# Patient Record
Sex: MALE | Race: WHITE | Employment: OTHER | URBAN - NONMETROPOLITAN AREA
[De-identification: names, ages, dates, MRNs, and addresses within clinical notes are randomized per-mention and may not be internally consistent; named-entity substitution may affect disease eponyms.]

---

## 2017-04-03 ENCOUNTER — OFFICE VISIT (OUTPATIENT)
Dept: OPHTHALMOLOGY | Age: 67
End: 2017-04-03
Payer: MEDICARE

## 2017-04-03 DIAGNOSIS — H02.839 DERMATOCHALASIS, UNSPECIFIED LATERALITY: ICD-10-CM

## 2017-04-03 DIAGNOSIS — H35.372 MACULAR PUCKERING OF RETINA, LEFT: ICD-10-CM

## 2017-04-03 DIAGNOSIS — H35.3290 EXUDATIVE SENILE MACULAR DEGENERATION OF RETINA (HCC): ICD-10-CM

## 2017-04-03 DIAGNOSIS — H40.10X1 COAG (CHRONIC OPEN-ANGLE GLAUCOMA), MILD STAGE: Primary | ICD-10-CM

## 2017-04-03 PROCEDURE — 1036F TOBACCO NON-USER: CPT | Performed by: OPHTHALMOLOGY

## 2017-04-03 PROCEDURE — G8421 BMI NOT CALCULATED: HCPCS | Performed by: OPHTHALMOLOGY

## 2017-04-03 PROCEDURE — G8427 DOCREV CUR MEDS BY ELIG CLIN: HCPCS | Performed by: OPHTHALMOLOGY

## 2017-04-03 PROCEDURE — 3017F COLORECTAL CA SCREEN DOC REV: CPT | Performed by: OPHTHALMOLOGY

## 2017-04-03 PROCEDURE — 4040F PNEUMOC VAC/ADMIN/RCVD: CPT | Performed by: OPHTHALMOLOGY

## 2017-04-03 PROCEDURE — 99213 OFFICE O/P EST LOW 20 MIN: CPT | Performed by: OPHTHALMOLOGY

## 2017-04-03 PROCEDURE — 1123F ACP DISCUSS/DSCN MKR DOCD: CPT | Performed by: OPHTHALMOLOGY

## 2017-04-03 RX ORDER — BENOXINATE HCL/FLUORESCEIN SOD 0.4%-0.25%
1 DROPS OPHTHALMIC (EYE) ONCE
Status: COMPLETED | OUTPATIENT
Start: 2017-04-03 | End: 2017-04-03

## 2017-04-03 RX ADMIN — Medication 1 DROP: at 08:09

## 2017-04-03 ASSESSMENT — TONOMETRY
OS_IOP_MMHG: 20
OD_IOP_MMHG: 20
IOP_METHOD: APPLANATION W FLURESS DROP

## 2017-04-03 ASSESSMENT — VISUAL ACUITY
OS_CC: 20/25
METHOD: SNELLEN - LINEAR
CORRECTION_TYPE: GLASSES

## 2017-04-03 ASSESSMENT — ENCOUNTER SYMPTOMS
GASTROINTESTINAL NEGATIVE: 1
RESPIRATORY NEGATIVE: 1

## 2017-04-03 ASSESSMENT — SLIT LAMP EXAM - LIDS
COMMENTS: 2+ DERMATOCHALASIS - UPPER LID
COMMENTS: 2+ DERMATOCHALASIS - UPPER LID

## 2017-08-08 ENCOUNTER — OFFICE VISIT (OUTPATIENT)
Dept: OPHTHALMOLOGY | Age: 67
End: 2017-08-08
Payer: MEDICARE

## 2017-08-08 DIAGNOSIS — H40.10X1 ADVANCED OPEN-ANGLE GLAUCOMA, MILD STAGE: Primary | ICD-10-CM

## 2017-08-08 PROCEDURE — 92083 EXTENDED VISUAL FIELD XM: CPT | Performed by: OPHTHALMOLOGY

## 2017-08-08 PROCEDURE — 92133 CPTRZD OPH DX IMG PST SGM ON: CPT | Performed by: OPHTHALMOLOGY

## 2017-08-08 PROCEDURE — 1036F TOBACCO NON-USER: CPT | Performed by: OPHTHALMOLOGY

## 2017-08-08 RX ORDER — TROPICAMIDE 10 MG/ML
1 SOLUTION/ DROPS OPHTHALMIC ONCE
Status: COMPLETED | OUTPATIENT
Start: 2017-08-08 | End: 2017-08-08

## 2017-08-08 RX ORDER — PHENYLEPHRINE HCL 2.5 %
1 DROPS OPHTHALMIC (EYE) ONCE
Status: COMPLETED | OUTPATIENT
Start: 2017-08-08 | End: 2017-08-08

## 2017-08-08 RX ADMIN — Medication 1 DROP: at 09:04

## 2017-08-08 RX ADMIN — TROPICAMIDE 1 DROP: 10 SOLUTION/ DROPS OPHTHALMIC at 09:04

## 2017-08-14 ENCOUNTER — OFFICE VISIT (OUTPATIENT)
Dept: OPHTHALMOLOGY | Age: 67
End: 2017-08-14
Payer: MEDICARE

## 2017-08-14 DIAGNOSIS — H02.839 DERMATOCHALASIS, UNSPECIFIED LATERALITY: ICD-10-CM

## 2017-08-14 DIAGNOSIS — H35.3290 EXUDATIVE SENILE MACULAR DEGENERATION OF RETINA (HCC): ICD-10-CM

## 2017-08-14 DIAGNOSIS — H40.10X1 ADVANCED OPEN-ANGLE GLAUCOMA, MILD STAGE: Primary | ICD-10-CM

## 2017-08-14 DIAGNOSIS — H35.372 MACULAR PUCKERING OF RETINA, LEFT: ICD-10-CM

## 2017-08-14 PROCEDURE — 1123F ACP DISCUSS/DSCN MKR DOCD: CPT | Performed by: OPHTHALMOLOGY

## 2017-08-14 PROCEDURE — G8421 BMI NOT CALCULATED: HCPCS | Performed by: OPHTHALMOLOGY

## 2017-08-14 PROCEDURE — 1036F TOBACCO NON-USER: CPT | Performed by: OPHTHALMOLOGY

## 2017-08-14 PROCEDURE — 3017F COLORECTAL CA SCREEN DOC REV: CPT | Performed by: OPHTHALMOLOGY

## 2017-08-14 PROCEDURE — 99214 OFFICE O/P EST MOD 30 MIN: CPT | Performed by: OPHTHALMOLOGY

## 2017-08-14 PROCEDURE — G8427 DOCREV CUR MEDS BY ELIG CLIN: HCPCS | Performed by: OPHTHALMOLOGY

## 2017-08-14 PROCEDURE — 4040F PNEUMOC VAC/ADMIN/RCVD: CPT | Performed by: OPHTHALMOLOGY

## 2017-08-14 RX ORDER — TROPICAMIDE 10 MG/ML
1 SOLUTION/ DROPS OPHTHALMIC ONCE
Status: COMPLETED | OUTPATIENT
Start: 2017-08-14 | End: 2017-08-14

## 2017-08-14 RX ORDER — BENOXINATE HCL/FLUORESCEIN SOD 0.4%-0.25%
1 DROPS OPHTHALMIC (EYE) ONCE
Status: COMPLETED | OUTPATIENT
Start: 2017-08-14 | End: 2017-08-14

## 2017-08-14 RX ORDER — PHENYLEPHRINE HCL 2.5 %
1 DROPS OPHTHALMIC (EYE) ONCE
Status: COMPLETED | OUTPATIENT
Start: 2017-08-14 | End: 2017-08-14

## 2017-08-14 RX ADMIN — TROPICAMIDE 1 DROP: 10 SOLUTION/ DROPS OPHTHALMIC at 08:24

## 2017-08-14 RX ADMIN — Medication 1 DROP: at 08:23

## 2017-08-14 ASSESSMENT — TONOMETRY
OD_IOP_MMHG: 16
OS_IOP_MMHG: 18
IOP_METHOD: APPLANATION W FLURESS DROP

## 2017-08-14 ASSESSMENT — VISUAL ACUITY
OD_CC+: -2
METHOD: SNELLEN - LINEAR
OS_CC: 20/30
CORRECTION_TYPE: GLASSES

## 2017-08-14 ASSESSMENT — SLIT LAMP EXAM - LIDS
COMMENTS: 2+ DERMATOCHALASIS - UPPER LID
COMMENTS: 2+ DERMATOCHALASIS - UPPER LID

## 2017-08-14 ASSESSMENT — ENCOUNTER SYMPTOMS
RESPIRATORY NEGATIVE: 1
GASTROINTESTINAL NEGATIVE: 1

## 2017-09-20 RX ORDER — TIMOLOL MALEATE 5 MG/ML
SOLUTION/ DROPS OPHTHALMIC
Qty: 30 ML | Refills: 3 | Status: SHIPPED | OUTPATIENT
Start: 2017-09-20 | End: 2018-06-07 | Stop reason: SINTOL

## 2017-12-06 ENCOUNTER — OFFICE VISIT (OUTPATIENT)
Dept: OPHTHALMOLOGY | Age: 67
End: 2017-12-06
Payer: MEDICARE

## 2017-12-06 DIAGNOSIS — H02.839 DERMATOCHALASIS, UNSPECIFIED LATERALITY: ICD-10-CM

## 2017-12-06 DIAGNOSIS — H40.10X1 ADVANCED OPEN-ANGLE GLAUCOMA, MILD STAGE: Primary | ICD-10-CM

## 2017-12-06 PROCEDURE — G8421 BMI NOT CALCULATED: HCPCS | Performed by: OPHTHALMOLOGY

## 2017-12-06 PROCEDURE — G8427 DOCREV CUR MEDS BY ELIG CLIN: HCPCS | Performed by: OPHTHALMOLOGY

## 2017-12-06 PROCEDURE — 1123F ACP DISCUSS/DSCN MKR DOCD: CPT | Performed by: OPHTHALMOLOGY

## 2017-12-06 PROCEDURE — 1036F TOBACCO NON-USER: CPT | Performed by: OPHTHALMOLOGY

## 2017-12-06 PROCEDURE — 4040F PNEUMOC VAC/ADMIN/RCVD: CPT | Performed by: OPHTHALMOLOGY

## 2017-12-06 PROCEDURE — G8484 FLU IMMUNIZE NO ADMIN: HCPCS | Performed by: OPHTHALMOLOGY

## 2017-12-06 PROCEDURE — 99213 OFFICE O/P EST LOW 20 MIN: CPT | Performed by: OPHTHALMOLOGY

## 2017-12-06 PROCEDURE — 3017F COLORECTAL CA SCREEN DOC REV: CPT | Performed by: OPHTHALMOLOGY

## 2017-12-06 RX ORDER — BENOXINATE HCL/FLUORESCEIN SOD 0.4%-0.25%
1 DROPS OPHTHALMIC (EYE) ONCE
Status: COMPLETED | OUTPATIENT
Start: 2017-12-06 | End: 2017-12-06

## 2017-12-06 RX ADMIN — Medication 1 DROP: at 09:55

## 2017-12-06 ASSESSMENT — ENCOUNTER SYMPTOMS
GASTROINTESTINAL NEGATIVE: 1
RESPIRATORY NEGATIVE: 1

## 2017-12-06 ASSESSMENT — VISUAL ACUITY
OS_CC+: -1
OD_CC+: -2
METHOD: SNELLEN - LINEAR
OS_CC: 20/30

## 2017-12-06 ASSESSMENT — SLIT LAMP EXAM - LIDS
COMMENTS: 2+ DERMATOCHALASIS - UPPER LID
COMMENTS: 2+ DERMATOCHALASIS - UPPER LID

## 2017-12-06 ASSESSMENT — TONOMETRY
IOP_METHOD: APPLANATION W FLURESS DROP
OS_IOP_MMHG: 18
OD_IOP_MMHG: 20

## 2017-12-06 NOTE — PATIENT INSTRUCTIONS
Take medication as prescribed. If you have any problems or concerns before your next scheduled appointment, please call the office at [phone].

## 2017-12-06 NOTE — PROGRESS NOTES
all below with patient. 1. Advanced open-angle glaucoma, mild stage  Timolol Maleate 0.5% 1 gtt OU  BID      2. Dermatochalasis, unspecified laterality  monitor     Patient Instructions   Take medication as prescribed. If you have any problems or concerns before your next scheduled appointment, please call the office at [phone]. Return in about 4 months (around 4/6/2018) for IOP check.

## 2018-06-07 ENCOUNTER — OFFICE VISIT (OUTPATIENT)
Dept: OPHTHALMOLOGY | Age: 68
End: 2018-06-07
Payer: MEDICARE

## 2018-06-07 DIAGNOSIS — H25.813 COMBINED FORMS OF AGE-RELATED CATARACT OF BOTH EYES: ICD-10-CM

## 2018-06-07 DIAGNOSIS — H40.1131 PRIMARY OPEN ANGLE GLAUCOMA OF BOTH EYES, MILD STAGE: Primary | ICD-10-CM

## 2018-06-07 DIAGNOSIS — H35.3131 NONEXUDATIVE AGE-RELATED MACULAR DEGENERATION, BILATERAL, EARLY DRY STAGE: ICD-10-CM

## 2018-06-07 PROCEDURE — 99214 OFFICE O/P EST MOD 30 MIN: CPT | Performed by: OPHTHALMOLOGY

## 2018-06-07 RX ORDER — TROPICAMIDE 10 MG/ML
1 SOLUTION/ DROPS OPHTHALMIC ONCE
Status: COMPLETED | OUTPATIENT
Start: 2018-06-07 | End: 2018-06-07

## 2018-06-07 RX ORDER — PHENYLEPHRINE HCL 2.5 %
1 DROPS OPHTHALMIC (EYE) ONCE
Status: COMPLETED | OUTPATIENT
Start: 2018-06-07 | End: 2018-06-07

## 2018-06-07 RX ORDER — LATANOPROST 50 UG/ML
1 SOLUTION/ DROPS OPHTHALMIC NIGHTLY
Qty: 3 BOTTLE | Refills: 3 | Status: SHIPPED | OUTPATIENT
Start: 2018-06-07 | End: 2019-02-14 | Stop reason: ALTCHOICE

## 2018-06-07 RX ORDER — BENOXINATE HCL/FLUORESCEIN SOD 0.4%-0.25%
1 DROPS OPHTHALMIC (EYE) ONCE
Status: COMPLETED | OUTPATIENT
Start: 2018-06-07 | End: 2018-06-07

## 2018-06-07 RX ADMIN — Medication 1 DROP: at 10:34

## 2018-06-07 RX ADMIN — TROPICAMIDE 1 DROP: 10 SOLUTION/ DROPS OPHTHALMIC at 10:34

## 2018-06-07 ASSESSMENT — TONOMETRY
OD_IOP_MMHG: 17
OS_IOP_MMHG: 18
IOP_METHOD: TONOPEN

## 2018-06-07 ASSESSMENT — REFRACTION_WEARINGRX
OD_SPHERE: +3.25
OS_CYLINDER: -0.75
OS_SPHERE: +2.25
OS_AXIS: 110
OD_CYLINDER: -2.50
OD_ADD: +2.50
OD_AXIS: 018
OS_ADD: +2.50

## 2018-06-07 ASSESSMENT — SLIT LAMP EXAM - LIDS
COMMENTS: MILD BLEPHARITIS. MILD MGD
COMMENTS: MILD BLEPHARITIS. MILD MGD

## 2018-06-07 ASSESSMENT — VISUAL ACUITY
OS_CC: 20/30
CORRECTION_TYPE: GLASSES
METHOD: SNELLEN - LINEAR

## 2018-08-09 ENCOUNTER — OFFICE VISIT (OUTPATIENT)
Dept: OPHTHALMOLOGY | Age: 68
End: 2018-08-09
Payer: MEDICARE

## 2018-08-09 DIAGNOSIS — H35.372 EPIRETINAL MEMBRANE (ERM) OF LEFT EYE: ICD-10-CM

## 2018-08-09 DIAGNOSIS — H40.1131 PRIMARY OPEN ANGLE GLAUCOMA OF BOTH EYES, MILD STAGE: ICD-10-CM

## 2018-08-09 DIAGNOSIS — H25.813 COMBINED FORMS OF AGE-RELATED CATARACT OF BOTH EYES: ICD-10-CM

## 2018-08-09 DIAGNOSIS — H35.3131 NONEXUDATIVE AGE-RELATED MACULAR DEGENERATION, BILATERAL, EARLY DRY STAGE: Primary | ICD-10-CM

## 2018-08-09 PROCEDURE — 92133 CPTRZD OPH DX IMG PST SGM ON: CPT | Performed by: OPHTHALMOLOGY

## 2018-08-09 PROCEDURE — 1036F TOBACCO NON-USER: CPT | Performed by: OPHTHALMOLOGY

## 2018-08-09 PROCEDURE — 92134 CPTRZ OPH DX IMG PST SGM RTA: CPT | Performed by: OPHTHALMOLOGY

## 2018-08-09 PROCEDURE — 1123F ACP DISCUSS/DSCN MKR DOCD: CPT | Performed by: OPHTHALMOLOGY

## 2018-08-09 PROCEDURE — 3017F COLORECTAL CA SCREEN DOC REV: CPT | Performed by: OPHTHALMOLOGY

## 2018-08-09 PROCEDURE — G8421 BMI NOT CALCULATED: HCPCS | Performed by: OPHTHALMOLOGY

## 2018-08-09 PROCEDURE — 99214 OFFICE O/P EST MOD 30 MIN: CPT | Performed by: OPHTHALMOLOGY

## 2018-08-09 PROCEDURE — 4040F PNEUMOC VAC/ADMIN/RCVD: CPT | Performed by: OPHTHALMOLOGY

## 2018-08-09 PROCEDURE — 1101F PT FALLS ASSESS-DOCD LE1/YR: CPT | Performed by: OPHTHALMOLOGY

## 2018-08-09 PROCEDURE — 92145 CORNEAL HYSTERESIS DETER: CPT | Performed by: OPHTHALMOLOGY

## 2018-08-09 PROCEDURE — G8427 DOCREV CUR MEDS BY ELIG CLIN: HCPCS | Performed by: OPHTHALMOLOGY

## 2018-08-09 ASSESSMENT — VISUAL ACUITY
OS_CC: 20/40
OS_PH_CC: 20/40
CORRECTION_TYPE: GLASSES
METHOD: SNELLEN - LINEAR

## 2018-08-09 ASSESSMENT — TONOMETRY
OD_IOP_MMHG: 13
IOP_METHOD: NON-CONTACT AIR PUFF
OS_IOP_MMHG: 16

## 2018-08-09 ASSESSMENT — SLIT LAMP EXAM - LIDS
COMMENTS: MILD BLEPHARITIS. MILD MGD
COMMENTS: MILD BLEPHARITIS. MILD MGD

## 2019-01-08 ENCOUNTER — PROCEDURE VISIT (OUTPATIENT)
Dept: OPHTHALMOLOGY | Age: 69
End: 2019-01-08
Payer: MEDICARE

## 2019-01-08 DIAGNOSIS — H40.1131 PRIMARY OPEN ANGLE GLAUCOMA OF BOTH EYES, MILD STAGE: Primary | ICD-10-CM

## 2019-01-08 PROCEDURE — 99213 OFFICE O/P EST LOW 20 MIN: CPT | Performed by: OPHTHALMOLOGY

## 2019-01-08 PROCEDURE — 1036F TOBACCO NON-USER: CPT | Performed by: OPHTHALMOLOGY

## 2019-01-08 PROCEDURE — G8427 DOCREV CUR MEDS BY ELIG CLIN: HCPCS | Performed by: OPHTHALMOLOGY

## 2019-01-08 PROCEDURE — 3017F COLORECTAL CA SCREEN DOC REV: CPT | Performed by: OPHTHALMOLOGY

## 2019-01-08 PROCEDURE — 4040F PNEUMOC VAC/ADMIN/RCVD: CPT | Performed by: OPHTHALMOLOGY

## 2019-01-08 PROCEDURE — G8421 BMI NOT CALCULATED: HCPCS | Performed by: OPHTHALMOLOGY

## 2019-01-08 PROCEDURE — 1101F PT FALLS ASSESS-DOCD LE1/YR: CPT | Performed by: OPHTHALMOLOGY

## 2019-01-08 PROCEDURE — 1123F ACP DISCUSS/DSCN MKR DOCD: CPT | Performed by: OPHTHALMOLOGY

## 2019-01-08 PROCEDURE — G8484 FLU IMMUNIZE NO ADMIN: HCPCS | Performed by: OPHTHALMOLOGY

## 2019-01-08 PROCEDURE — 65855 TRABECULOPLASTY LASER SURG: CPT | Performed by: OPHTHALMOLOGY

## 2019-01-08 RX ORDER — PREDNISOLONE ACETATE 10 MG/ML
1 SUSPENSION/ DROPS OPHTHALMIC ONCE
Status: COMPLETED | OUTPATIENT
Start: 2019-01-08 | End: 2019-01-08

## 2019-01-08 RX ORDER — TETRACAINE HYDROCHLORIDE 5 MG/ML
2 SOLUTION OPHTHALMIC ONCE
Status: COMPLETED | OUTPATIENT
Start: 2019-01-08 | End: 2019-01-08

## 2019-01-08 RX ORDER — PREDNISOLONE ACETATE 10 MG/ML
1 SUSPENSION/ DROPS OPHTHALMIC 3 TIMES DAILY
Qty: 1 BOTTLE | Refills: 0 | Status: SHIPPED | OUTPATIENT
Start: 2019-01-08 | End: 2019-01-13

## 2019-01-08 RX ORDER — BRIMONIDINE TARTRATE 2 MG/ML
1 SOLUTION/ DROPS OPHTHALMIC ONCE
Status: COMPLETED | OUTPATIENT
Start: 2019-01-08 | End: 2019-01-08

## 2019-01-08 RX ORDER — PILOCARPINE HYDROCHLORIDE 20 MG/ML
2 SOLUTION/ DROPS OPHTHALMIC ONCE
Status: COMPLETED | OUTPATIENT
Start: 2019-01-08 | End: 2019-01-08

## 2019-01-08 RX ADMIN — PREDNISOLONE ACETATE 1 DROP: 10 SUSPENSION/ DROPS OPHTHALMIC at 11:06

## 2019-01-08 RX ADMIN — PILOCARPINE HYDROCHLORIDE 2 DROP: 20 SOLUTION/ DROPS OPHTHALMIC at 11:06

## 2019-01-08 RX ADMIN — BRIMONIDINE TARTRATE 1 DROP: 2 SOLUTION/ DROPS OPHTHALMIC at 11:05

## 2019-01-08 RX ADMIN — TETRACAINE HYDROCHLORIDE 2 DROP: 5 SOLUTION OPHTHALMIC at 11:06

## 2019-01-08 ASSESSMENT — CONF VISUAL FIELD
OD_NORMAL: 1
OS_NORMAL: 1

## 2019-01-08 ASSESSMENT — TONOMETRY
OS_IOP_MMHG: 17
OD_IOP_MMHG: 16
IOP_METHOD: NON-CONTACT AIR PUFF

## 2019-01-08 ASSESSMENT — SLIT LAMP EXAM - LIDS
COMMENTS: MILD BLEPHARITIS. MILD MGD
COMMENTS: MILD BLEPHARITIS. MILD MGD

## 2019-01-08 ASSESSMENT — VISUAL ACUITY
CORRECTION_TYPE: GLASSES
METHOD: SNELLEN - LINEAR
OS_CC+: 2
OS_CC: 20/50

## 2019-01-09 ENCOUNTER — PROCEDURE VISIT (OUTPATIENT)
Dept: OPHTHALMOLOGY | Age: 69
End: 2019-01-09
Payer: MEDICARE

## 2019-01-09 DIAGNOSIS — H40.1131 PRIMARY OPEN ANGLE GLAUCOMA OF BOTH EYES, MILD STAGE: Primary | ICD-10-CM

## 2019-01-09 PROCEDURE — 3017F COLORECTAL CA SCREEN DOC REV: CPT | Performed by: OPHTHALMOLOGY

## 2019-01-09 PROCEDURE — G8421 BMI NOT CALCULATED: HCPCS | Performed by: OPHTHALMOLOGY

## 2019-01-09 PROCEDURE — G8484 FLU IMMUNIZE NO ADMIN: HCPCS | Performed by: OPHTHALMOLOGY

## 2019-01-09 PROCEDURE — 1123F ACP DISCUSS/DSCN MKR DOCD: CPT | Performed by: OPHTHALMOLOGY

## 2019-01-09 PROCEDURE — 1101F PT FALLS ASSESS-DOCD LE1/YR: CPT | Performed by: OPHTHALMOLOGY

## 2019-01-09 PROCEDURE — 99024 POSTOP FOLLOW-UP VISIT: CPT | Performed by: OPHTHALMOLOGY

## 2019-01-09 PROCEDURE — G8427 DOCREV CUR MEDS BY ELIG CLIN: HCPCS | Performed by: OPHTHALMOLOGY

## 2019-01-09 PROCEDURE — 4040F PNEUMOC VAC/ADMIN/RCVD: CPT | Performed by: OPHTHALMOLOGY

## 2019-01-09 PROCEDURE — 1036F TOBACCO NON-USER: CPT | Performed by: OPHTHALMOLOGY

## 2019-01-09 PROCEDURE — 65855 TRABECULOPLASTY LASER SURG: CPT | Performed by: OPHTHALMOLOGY

## 2019-01-09 RX ORDER — BRIMONIDINE TARTRATE 2 MG/ML
1 SOLUTION/ DROPS OPHTHALMIC ONCE
Status: COMPLETED | OUTPATIENT
Start: 2019-01-09 | End: 2019-01-09

## 2019-01-09 RX ORDER — TETRACAINE HYDROCHLORIDE 5 MG/ML
2 SOLUTION OPHTHALMIC ONCE
Status: COMPLETED | OUTPATIENT
Start: 2019-01-09 | End: 2019-01-09

## 2019-01-09 RX ORDER — PILOCARPINE HYDROCHLORIDE 20 MG/ML
2 SOLUTION/ DROPS OPHTHALMIC ONCE
Status: COMPLETED | OUTPATIENT
Start: 2019-01-09 | End: 2019-01-09

## 2019-01-09 RX ORDER — PREDNISOLONE ACETATE 10 MG/ML
1 SUSPENSION/ DROPS OPHTHALMIC ONCE
Status: COMPLETED | OUTPATIENT
Start: 2019-01-09 | End: 2019-01-09

## 2019-01-09 RX ADMIN — BRIMONIDINE TARTRATE 1 DROP: 2 SOLUTION/ DROPS OPHTHALMIC at 15:58

## 2019-01-09 RX ADMIN — TETRACAINE HYDROCHLORIDE 2 DROP: 5 SOLUTION OPHTHALMIC at 16:00

## 2019-01-09 RX ADMIN — PREDNISOLONE ACETATE 1 DROP: 10 SUSPENSION/ DROPS OPHTHALMIC at 15:58

## 2019-01-09 RX ADMIN — PILOCARPINE HYDROCHLORIDE 2 DROP: 20 SOLUTION/ DROPS OPHTHALMIC at 15:58

## 2019-01-09 ASSESSMENT — VISUAL ACUITY
METHOD: SNELLEN - LINEAR
CORRECTION_TYPE: GLASSES
OS_CC: 20/40

## 2019-01-09 ASSESSMENT — TONOMETRY
OS_IOP_MMHG: 16
OD_IOP_MMHG: 19
IOP_METHOD: NON-CONTACT AIR PUFF

## 2019-01-09 ASSESSMENT — SLIT LAMP EXAM - LIDS
COMMENTS: MILD BLEPHARITIS. MILD MGD
COMMENTS: MILD BLEPHARITIS. MILD MGD

## 2019-02-14 ENCOUNTER — OFFICE VISIT (OUTPATIENT)
Dept: OPHTHALMOLOGY | Age: 69
End: 2019-02-14
Payer: MEDICARE

## 2019-02-14 DIAGNOSIS — H40.1131 PRIMARY OPEN ANGLE GLAUCOMA OF BOTH EYES, MILD STAGE: Primary | ICD-10-CM

## 2019-02-14 PROCEDURE — 4040F PNEUMOC VAC/ADMIN/RCVD: CPT | Performed by: OPHTHALMOLOGY

## 2019-02-14 PROCEDURE — 1123F ACP DISCUSS/DSCN MKR DOCD: CPT | Performed by: OPHTHALMOLOGY

## 2019-02-14 PROCEDURE — G8484 FLU IMMUNIZE NO ADMIN: HCPCS | Performed by: OPHTHALMOLOGY

## 2019-02-14 PROCEDURE — G8421 BMI NOT CALCULATED: HCPCS | Performed by: OPHTHALMOLOGY

## 2019-02-14 PROCEDURE — 1101F PT FALLS ASSESS-DOCD LE1/YR: CPT | Performed by: OPHTHALMOLOGY

## 2019-02-14 PROCEDURE — 3017F COLORECTAL CA SCREEN DOC REV: CPT | Performed by: OPHTHALMOLOGY

## 2019-02-14 PROCEDURE — 1036F TOBACCO NON-USER: CPT | Performed by: OPHTHALMOLOGY

## 2019-02-14 PROCEDURE — 99213 OFFICE O/P EST LOW 20 MIN: CPT | Performed by: OPHTHALMOLOGY

## 2019-02-14 PROCEDURE — G8427 DOCREV CUR MEDS BY ELIG CLIN: HCPCS | Performed by: OPHTHALMOLOGY

## 2019-02-14 ASSESSMENT — VISUAL ACUITY
OS_CC: 20/40
CORRECTION_TYPE: GLASSES
METHOD: SNELLEN - LINEAR

## 2019-02-14 ASSESSMENT — TONOMETRY
OD_IOP_MMHG: 19
IOP_METHOD: PNEUMO-TONOMETER
OS_IOP_MMHG: 22

## 2019-02-14 ASSESSMENT — SLIT LAMP EXAM - LIDS
COMMENTS: MILD BLEPHARITIS. MILD MGD
COMMENTS: MILD BLEPHARITIS. MILD MGD

## 2019-08-22 ENCOUNTER — OFFICE VISIT (OUTPATIENT)
Dept: OPHTHALMOLOGY | Age: 69
End: 2019-08-22
Payer: MEDICARE

## 2019-08-22 DIAGNOSIS — H35.372 EPIRETINAL MEMBRANE (ERM) OF LEFT EYE: ICD-10-CM

## 2019-08-22 DIAGNOSIS — H35.3131 NONEXUDATIVE AGE-RELATED MACULAR DEGENERATION, BILATERAL, EARLY DRY STAGE: ICD-10-CM

## 2019-08-22 DIAGNOSIS — H40.1131 PRIMARY OPEN ANGLE GLAUCOMA OF BOTH EYES, MILD STAGE: ICD-10-CM

## 2019-08-22 DIAGNOSIS — H25.813 COMBINED FORMS OF AGE-RELATED CATARACT OF BOTH EYES: Primary | ICD-10-CM

## 2019-08-22 PROCEDURE — G8421 BMI NOT CALCULATED: HCPCS | Performed by: OPHTHALMOLOGY

## 2019-08-22 PROCEDURE — 4040F PNEUMOC VAC/ADMIN/RCVD: CPT | Performed by: OPHTHALMOLOGY

## 2019-08-22 PROCEDURE — 99214 OFFICE O/P EST MOD 30 MIN: CPT | Performed by: OPHTHALMOLOGY

## 2019-08-22 PROCEDURE — G8427 DOCREV CUR MEDS BY ELIG CLIN: HCPCS | Performed by: OPHTHALMOLOGY

## 2019-08-22 PROCEDURE — 1123F ACP DISCUSS/DSCN MKR DOCD: CPT | Performed by: OPHTHALMOLOGY

## 2019-08-22 PROCEDURE — 1036F TOBACCO NON-USER: CPT | Performed by: OPHTHALMOLOGY

## 2019-08-22 PROCEDURE — 3017F COLORECTAL CA SCREEN DOC REV: CPT | Performed by: OPHTHALMOLOGY

## 2019-08-22 PROCEDURE — 92250 FUNDUS PHOTOGRAPHY W/I&R: CPT | Performed by: OPHTHALMOLOGY

## 2019-08-22 RX ORDER — PHENYLEPHRINE HYDROCHLORIDE 100 MG/ML
1 SOLUTION/ DROPS OPHTHALMIC ONCE
Status: COMPLETED | OUTPATIENT
Start: 2019-08-22 | End: 2019-08-22

## 2019-08-22 RX ORDER — TROPICAMIDE 10 MG/ML
1 SOLUTION/ DROPS OPHTHALMIC ONCE
Status: COMPLETED | OUTPATIENT
Start: 2019-08-22 | End: 2019-08-22

## 2019-08-22 RX ADMIN — TROPICAMIDE 1 DROP: 10 SOLUTION/ DROPS OPHTHALMIC at 10:18

## 2019-08-22 RX ADMIN — PHENYLEPHRINE HYDROCHLORIDE 1 DROP: 100 SOLUTION/ DROPS OPHTHALMIC at 10:18

## 2019-08-22 ASSESSMENT — ENCOUNTER SYMPTOMS
GASTROINTESTINAL NEGATIVE: 0
ALLERGIC/IMMUNOLOGIC NEGATIVE: 0
RESPIRATORY NEGATIVE: 0
EYES NEGATIVE: 0

## 2019-08-22 ASSESSMENT — CONF VISUAL FIELD
OD_NORMAL: 1
OS_NORMAL: 1

## 2019-08-22 ASSESSMENT — VISUAL ACUITY
OS_CC: 20/40
OS_PH_CC: 20/40
METHOD: SNELLEN - LINEAR
CORRECTION_TYPE: GLASSES

## 2019-08-22 ASSESSMENT — TONOMETRY
OD_IOP_MMHG: 17
OS_IOP_MMHG: 18
IOP_METHOD: NON-CONTACT AIR PUFF

## 2019-08-22 ASSESSMENT — SLIT LAMP EXAM - LIDS
COMMENTS: MILD BLEPHARITIS. MILD MGD
COMMENTS: MILD BLEPHARITIS. MILD MGD

## 2019-09-11 ENCOUNTER — OFFICE VISIT (OUTPATIENT)
Dept: OPHTHALMOLOGY | Age: 69
End: 2019-09-11
Payer: MEDICARE

## 2019-09-11 DIAGNOSIS — H25.813 COMBINED FORMS OF AGE-RELATED CATARACT OF BOTH EYES: ICD-10-CM

## 2019-09-11 PROCEDURE — 99999 PR OFFICE/OUTPT VISIT,PROCEDURE ONLY: CPT | Performed by: OPHTHALMOLOGY

## 2019-09-11 PROCEDURE — 92136 OPHTHALMIC BIOMETRY: CPT | Performed by: OPHTHALMOLOGY

## 2019-09-11 RX ORDER — PREDNISOLONE/MOXIFLO/NEPAFENAC 1-0.5-0.1%
SUSPENSION, DROPS(FINAL DOSAGE FORM)(ML) OPHTHALMIC (EYE)
Qty: 1 BOTTLE | Refills: 0 | Status: SHIPPED | OUTPATIENT
Start: 2019-09-11

## 2019-09-11 RX ORDER — TIMOLOL MALEATE 5 MG/ML
1 SOLUTION/ DROPS OPHTHALMIC DAILY
Qty: 1 BOTTLE | Refills: 0 | Status: SHIPPED | OUTPATIENT
Start: 2019-09-11 | End: 2019-09-12

## 2019-11-07 ENCOUNTER — HOSPITAL ENCOUNTER (OUTPATIENT)
Age: 69
Setting detail: OUTPATIENT SURGERY
Discharge: HOME | End: 2019-11-07
Attending: OPHTHALMOLOGY | Admitting: OPHTHALMOLOGY
Payer: MEDICARE

## 2019-11-07 ENCOUNTER — ANESTHESIA EVENT (OUTPATIENT)
Dept: OPERATING ROOM | Age: 69
End: 2019-11-07
Payer: MEDICARE

## 2019-11-07 ENCOUNTER — ANESTHESIA (OUTPATIENT)
Dept: OPERATING ROOM | Age: 69
End: 2019-11-07
Payer: MEDICARE

## 2019-11-07 VITALS
DIASTOLIC BLOOD PRESSURE: 68 MMHG | OXYGEN SATURATION: 97 % | HEIGHT: 62 IN | SYSTOLIC BLOOD PRESSURE: 141 MMHG | BODY MASS INDEX: 31.28 KG/M2 | WEIGHT: 170 LBS | TEMPERATURE: 97.5 F | RESPIRATION RATE: 16 BRPM | HEART RATE: 65 BPM

## 2019-11-07 VITALS
DIASTOLIC BLOOD PRESSURE: 63 MMHG | SYSTOLIC BLOOD PRESSURE: 134 MMHG | OXYGEN SATURATION: 100 % | RESPIRATION RATE: 11 BRPM

## 2019-11-07 PROCEDURE — 7100000010 HC PHASE II RECOVERY - FIRST 15 MIN: Performed by: OPHTHALMOLOGY

## 2019-11-07 PROCEDURE — 66984 XCAPSL CTRC RMVL W/O ECP: CPT | Performed by: OPHTHALMOLOGY

## 2019-11-07 PROCEDURE — 7100000011 HC PHASE II RECOVERY - ADDTL 15 MIN: Performed by: OPHTHALMOLOGY

## 2019-11-07 PROCEDURE — 6370000000 HC RX 637 (ALT 250 FOR IP): Performed by: OPHTHALMOLOGY

## 2019-11-07 PROCEDURE — 2580000003 HC RX 258: Performed by: OPHTHALMOLOGY

## 2019-11-07 PROCEDURE — 2709999900 HC NON-CHARGEABLE SUPPLY: Performed by: OPHTHALMOLOGY

## 2019-11-07 PROCEDURE — V2632 POST CHMBR INTRAOCULAR LENS: HCPCS | Performed by: OPHTHALMOLOGY

## 2019-11-07 PROCEDURE — 3700000000 HC ANESTHESIA ATTENDED CARE: Performed by: OPHTHALMOLOGY

## 2019-11-07 PROCEDURE — 3600000002 HC SURGERY LEVEL 2 BASE: Performed by: OPHTHALMOLOGY

## 2019-11-07 PROCEDURE — 3700000001 HC ADD 15 MINUTES (ANESTHESIA): Performed by: OPHTHALMOLOGY

## 2019-11-07 PROCEDURE — 6360000002 HC RX W HCPCS: Performed by: OPHTHALMOLOGY

## 2019-11-07 PROCEDURE — 2500000003 HC RX 250 WO HCPCS: Performed by: OPHTHALMOLOGY

## 2019-11-07 PROCEDURE — 3600000012 HC SURGERY LEVEL 2 ADDTL 15MIN: Performed by: OPHTHALMOLOGY

## 2019-11-07 DEVICE — LENS INTRAOCULAR 1 PC 20.5+ DIOPT 6X12.5 MM POST CHMBR FLD: Type: IMPLANTABLE DEVICE | Site: EYE | Status: FUNCTIONAL

## 2019-11-07 RX ORDER — TROPICAMIDE - PROPARACINE - PHENYLEPHRINE - KETOROLAC 10; 5; 5; 25 MG/ML; MG/ML; MG/ML; MG/ML
1 SOLUTION/ DROPS OPHTHALMIC SEE ADMIN INSTRUCTIONS
Status: DISCONTINUED | OUTPATIENT
Start: 2019-11-07 | End: 2019-11-07 | Stop reason: HOSPADM

## 2019-11-07 RX ORDER — TROPICAMIDE - PROPARACINE - PHENYLEPHRINE - KETOROLAC 10; 5; 5; 25 MG/ML; MG/ML; MG/ML; MG/ML
SOLUTION/ DROPS OPHTHALMIC PRN
Status: DISCONTINUED | OUTPATIENT
Start: 2019-11-07 | End: 2019-11-07 | Stop reason: ALTCHOICE

## 2019-11-07 RX ORDER — DEXAMETHASONE PHOSPHATE - MOXIFLOXACIN - KETOROLAC TROMETHAMINE 1; .5; .4 MG/ML; MG/ML; MG/ML
INJECTION, SOLUTION OPHTHALMIC PRN
Status: DISCONTINUED | OUTPATIENT
Start: 2019-11-07 | End: 2019-11-07 | Stop reason: ALTCHOICE

## 2019-11-07 RX ORDER — EPINEPHRINE 1 MG/ML
INJECTION, SOLUTION, CONCENTRATE INTRAVENOUS PRN
Status: DISCONTINUED | OUTPATIENT
Start: 2019-11-07 | End: 2019-11-07 | Stop reason: ALTCHOICE

## 2019-11-07 RX ORDER — SODIUM CHLORIDE 0.9 % (FLUSH) 0.9 %
10 SYRINGE (ML) INJECTION PRN
Status: DISCONTINUED | OUTPATIENT
Start: 2019-11-07 | End: 2019-11-07 | Stop reason: HOSPADM

## 2019-11-07 RX ADMIN — Medication 1 DROP: at 13:11

## 2019-11-07 RX ADMIN — SODIUM CHLORIDE, PRESERVATIVE FREE 10 ML: 5 INJECTION INTRAVENOUS at 13:03

## 2019-11-07 RX ADMIN — Medication 1 DROP: at 13:01

## 2019-11-07 ASSESSMENT — PULMONARY FUNCTION TESTS
PIF_VALUE: 0

## 2019-11-07 ASSESSMENT — PAIN SCALES - GENERAL: PAINLEVEL_OUTOF10: 0

## 2019-11-07 ASSESSMENT — PAIN - FUNCTIONAL ASSESSMENT: PAIN_FUNCTIONAL_ASSESSMENT: 0-10

## 2019-11-08 ENCOUNTER — OFFICE VISIT (OUTPATIENT)
Dept: OPHTHALMOLOGY | Age: 69
End: 2019-11-08

## 2019-11-08 DIAGNOSIS — Z48.810 POSTOPERATIVE CARE FOR CATARACT OF LEFT EYE: Primary | ICD-10-CM

## 2019-11-08 DIAGNOSIS — Z98.42 POSTOPERATIVE CARE FOR CATARACT OF LEFT EYE: Primary | ICD-10-CM

## 2019-11-08 PROBLEM — H25.811 COMBINED FORMS OF AGE-RELATED CATARACT OF RIGHT EYE: Status: ACTIVE | Noted: 2018-06-07

## 2019-11-08 PROCEDURE — 99024 POSTOP FOLLOW-UP VISIT: CPT | Performed by: OPHTHALMOLOGY

## 2019-11-08 RX ORDER — PREDNISOLONE ACETATE/NEPAFENAC 1 %-0.1 %
SUSPENSION, DROPS(FINAL DOSAGE FORM)(ML) OPHTHALMIC (EYE)
Qty: 1 BOTTLE | Refills: 1 | Status: SHIPPED | OUTPATIENT
Start: 2019-11-08

## 2019-11-08 ASSESSMENT — VISUAL ACUITY
OS_PH_SC: 20/50
METHOD: SNELLEN - LINEAR
OS_SC: 20/50

## 2019-11-08 ASSESSMENT — TONOMETRY
IOP_METHOD: NON-CONTACT AIR PUFF
OS_IOP_MMHG: 26
OD_IOP_MMHG: 17

## 2019-11-08 ASSESSMENT — SLIT LAMP EXAM - LIDS: COMMENTS: NO EDEMA, NO ERYTHEMA

## 2021-01-12 ENCOUNTER — OFFICE VISIT (OUTPATIENT)
Dept: PRIMARY CARE CLINIC | Age: 71
End: 2021-01-12
Payer: MEDICARE

## 2021-01-12 ENCOUNTER — HOSPITAL ENCOUNTER (OUTPATIENT)
Age: 71
Setting detail: SPECIMEN
Discharge: HOME | End: 2021-01-12
Payer: MEDICARE

## 2021-01-12 VITALS
SYSTOLIC BLOOD PRESSURE: 125 MMHG | HEART RATE: 84 BPM | WEIGHT: 180.4 LBS | DIASTOLIC BLOOD PRESSURE: 83 MMHG | BODY MASS INDEX: 33.2 KG/M2 | HEIGHT: 62 IN | OXYGEN SATURATION: 98 % | RESPIRATION RATE: 16 BRPM | TEMPERATURE: 98.1 F

## 2021-01-12 DIAGNOSIS — R19.7 DIARRHEA, UNSPECIFIED TYPE: ICD-10-CM

## 2021-01-12 DIAGNOSIS — Z20.822 EXPOSURE TO COVID-19 VIRUS: ICD-10-CM

## 2021-01-12 DIAGNOSIS — R05.9 COUGH: ICD-10-CM

## 2021-01-12 DIAGNOSIS — R43.0 LOSS OF PERCEPTION FOR SMELL: Primary | ICD-10-CM

## 2021-01-12 DIAGNOSIS — Z20.822 PERSON UNDER INVESTIGATION FOR COVID-19: ICD-10-CM

## 2021-01-12 DIAGNOSIS — R43.0 LOSS OF PERCEPTION FOR SMELL: ICD-10-CM

## 2021-01-12 PROCEDURE — 4040F PNEUMOC VAC/ADMIN/RCVD: CPT | Performed by: NURSE PRACTITIONER

## 2021-01-12 PROCEDURE — 3017F COLORECTAL CA SCREEN DOC REV: CPT | Performed by: NURSE PRACTITIONER

## 2021-01-12 PROCEDURE — 99212 OFFICE O/P EST SF 10 MIN: CPT

## 2021-01-12 PROCEDURE — G8417 CALC BMI ABV UP PARAM F/U: HCPCS | Performed by: NURSE PRACTITIONER

## 2021-01-12 PROCEDURE — G8427 DOCREV CUR MEDS BY ELIG CLIN: HCPCS | Performed by: NURSE PRACTITIONER

## 2021-01-12 PROCEDURE — G8484 FLU IMMUNIZE NO ADMIN: HCPCS | Performed by: NURSE PRACTITIONER

## 2021-01-12 PROCEDURE — 1123F ACP DISCUSS/DSCN MKR DOCD: CPT | Performed by: NURSE PRACTITIONER

## 2021-01-12 PROCEDURE — 1036F TOBACCO NON-USER: CPT | Performed by: NURSE PRACTITIONER

## 2021-01-12 PROCEDURE — 99203 OFFICE O/P NEW LOW 30 MIN: CPT | Performed by: NURSE PRACTITIONER

## 2021-01-12 PROCEDURE — U0003 INFECTIOUS AGENT DETECTION BY NUCLEIC ACID (DNA OR RNA); SEVERE ACUTE RESPIRATORY SYNDROME CORONAVIRUS 2 (SARS-COV-2) (CORONAVIRUS DISEASE [COVID-19]), AMPLIFIED PROBE TECHNIQUE, MAKING USE OF HIGH THROUGHPUT TECHNOLOGIES AS DESCRIBED BY CMS-2020-01-R: HCPCS

## 2021-01-12 ASSESSMENT — PATIENT HEALTH QUESTIONNAIRE - PHQ9
SUM OF ALL RESPONSES TO PHQ QUESTIONS 1-9: 0
2. FEELING DOWN, DEPRESSED OR HOPELESS: 0
SUM OF ALL RESPONSES TO PHQ QUESTIONS 1-9: 0
SUM OF ALL RESPONSES TO PHQ QUESTIONS 1-9: 0
SUM OF ALL RESPONSES TO PHQ9 QUESTIONS 1 & 2: 0

## 2021-01-12 ASSESSMENT — ENCOUNTER SYMPTOMS
DIARRHEA: 1
VOMITING: 0
WHEEZING: 0
SHORTNESS OF BREATH: 0
COUGH: 1
NAUSEA: 0

## 2021-01-12 NOTE — PATIENT INSTRUCTIONS
Will notify you of covid test result as soon as available. You should isolate at home in an area away from family. If you must be around family members, please wear a mask. Quarantine at home until result is available. This means do not go to work/school, attend family gatherings, or invite others to your home until you know your test results. A work/school note will be provided for you. Symptoms appear viral; no antibiotic warranted at this time. May try mucinex (guaifenesin) to help with chest congestion and robitussin for persistent cough. May use tylenol fever/body aches/headache. Increase water intake to help thin secretions. Use cool mist humidifier at bedtime. Use nasal saline flush as needed. Practice good hand hygiene and cover your cough and sneeze to prevent passing virus on. If symptoms worsen or fail to improve, please return to clinic. If you develop severe worsening of symptoms such as chest pain or difficulty breathing, please go to ER. Patient Education        Learning About Coronavirus [phone])  Coronavirus [phone]): Overview  What is coronavirus ([id number])? The coronavirus disease (COVID-19) is caused by a virus. It is an illness that was first found in December 2019. It has since spread worldwide. The virus can cause fever, cough, and trouble breathing. In severe cases, it can cause pneumonia and make it hard to breathe without help. It can cause death. This virus spreads person-to-person through droplets from coughing and sneezing. It can also spread when you are close to someone who is infected. And it can spread when you touch something that has the virus on it, such as a doorknob or a tabletop. Coronaviruses are a large group of viruses. They cause the common cold. They also cause more serious illnesses like [place] respiratory syndrome (MERS) and severe acute respiratory syndrome (SARS). COVID-19 is caused by a novel coronavirus. That means it's a new type that has not been seen in people before. How is COVID-19 treated? Mild illness can be treated at home, but more serious illness needs to be treated in the hospital. Treatment may include medicines to reduce symptoms, plus breathing support such as oxygen therapy or a ventilator. Other treatments, such as antiviral medicines, may help people who have COVID-19. What can you do to protect yourself from COVID-19? The best way to protect yourself from getting sick is to:  · Avoid areas where there is an outbreak. · Avoid contact with people who may be infected. · Avoid crowds and try to stay at least 6 feet away from other people. · Wash your hands often, especially after you cough or sneeze. Use soap and water, and scrub for at least 20 seconds. If soap and water aren't available, use an alcohol-based hand . · Avoid touching your mouth, nose, and eyes. What can you do to avoid spreading the virus to others? To help avoid spreading the virus to others:  · Wash your hands often with soap or alcohol-based hand sanitizers. · Cover your mouth with a tissue when you cough or sneeze. Then throw the tissue in the trash. · Use a disinfectant to clean things that you touch often. These include doorknobs, remote controls, phones, and handles on your refrigerator and microwave. And don't forget countertops, tabletops, bathrooms, and computer keyboards. · Wear a cloth face cover if you have to go to public areas. If you know or suspect that you have COVID-19:  · Stay home. Don't go to school, work, or public areas. And don't use public transportation, ride-shares, or taxis unless you have no choice. · Leave your home only if you need to get medical care or testing. But call the doctor's office first so they know you're coming. And wear a face cover. · Limit contact with people in your home. If possible, stay in a separate bedroom and use a separate bathroom. · Wear a face cover whenever you're around other people. It can help stop the spread of the virus when you cough or sneeze. · Clean and disinfect your home every day. Use household  and disinfectant wipes or sprays. Take special care to clean things that you grab with your hands. · Self-isolate until it's safe to be around others again. ? If you have symptoms, it's safe when you haven't had a fever for 3 days and your symptoms have improved and it's been at least 10 days since your symptoms started. ? If you were exposed to the virus but don't have symptoms, it's safe to be around others 14 days after exposure. ? Talk to your doctor about whether you also need testing, especially if you have a weakened immune system. When to call for help  Call 911 anytime you think you may need emergency care. For example, call if:  · You have severe trouble breathing. (You can't talk at all.)  · You have constant chest pain or pressure. · You are severely dizzy or lightheaded. · You are confused or can't think clearly. · Your face and lips have a blue color. · You passed out (lost consciousness) or are very hard to wake up. Call your doctor now if you develop symptoms such as:  · Shortness of breath. · Fever. · Cough. If you need to get care, call ahead to the doctor's office for instructions before you go. Make sure you wear a face cover to prevent exposing other people to the virus. Where can you get the latest information? The following health organizations are tracking and studying this virus. Their websites contain the most up-to-date information. Patti Pacheco also learn what to do if you think you may have been exposed to the virus. · U.[place]. Centers for Disease Control and Prevention (CDC): The CDC provides updated news about the disease and travel advice. The website also tells you how to prevent the spread of infection. www.cdc.gov  · World Health Organization [facility]): WHO offers information about the virus outbreaks. WHO also has travel advice. www.who.int  Current as of: July 10, 2020               Content Version: 12.6  © 2006-2020 [employer]. Care instructions adapted under license by [facility] ([facility]). If you have questions about a medical condition or this instruction, always ask your healthcare professional. [place] [address] any warranty or liability for your use of this information. Patient Education        Coronavirus ([id number]): Care Instructions  Overview  The coronavirus disease (COVID-19) is caused by a virus. Symptoms may include a fever, a cough, and shortness of breath. It mainly spreads person-to-person through droplets from coughing and sneezing. The virus also can spread when people are in close contact with someone who is infected. Most people have mild symptoms and can take care of themselves at home. If their symptoms get worse, they may need care in a hospital. Treatment may include medicines to reduce symptoms, plus breathing support such as oxygen therapy or a ventilator. It's important to not spread the virus to others. If you have COVID-19, wear a face cover anytime you are around other people. You need to isolate yourself while you are sick. Leave your home only if you need to get medical care or testing. Follow-up care is a key part of your treatment and safety. Be sure to make and go to all appointments, and call your doctor if you are having problems. It's also a good idea to know your test results and keep a list of the medicines you take. How can you care for yourself at home? · Get extra rest. It can help you feel better. · Drink plenty of fluids. This helps replace fluids lost from fever. Fluids also help ease a scratchy throat. Water, soup, fruit juice, and hot tea with lemon are good choices. · Take acetaminophen (such as Tylenol) to reduce a fever. It may also help with muscle aches. Read and follow all instructions on the label. · Use petroleum jelly on sore skin. This can help if the skin around your nose and lips becomes sore from rubbing a lot with tissues. Tips for self-isolation  · Limit contact with people in your home. If possible, stay in a separate bedroom and use a separate bathroom. · Wear a cloth face cover when you are around other people. It can help stop the spread of the virus when you cough or sneeze. · If you have to leave home, avoid crowds and try to stay at least 6 feet away from other people. · Avoid contact with pets and other animals. · Cover your mouth and nose with a tissue when you cough or sneeze. Then throw it in the trash right away. · Wash your hands often, especially after you cough or sneeze. Use soap and water, and scrub for at least 20 seconds. If soap and water aren't available, use an alcohol-based hand . · Don't share personal household items. These include bedding, towels, cups and glasses, and eating utensils. · [address] in the warmest water allowed for the fabric type, and dry it completely. It's okay to wash other people's laundry with yours. · Clean and disinfect your home every day. Use household  and disinfectant wipes or sprays. Take special care to clean things that you grab with your hands. These include doorknobs, remote controls, phones, and handles on your refrigerator and microwave. And don't forget countertops, tabletops, bathrooms, and computer keyboards. When you can end self-isolation  · If you know or suspect that you have COVID-19, stay in self-isolation until:  ? You haven't had a fever for 3 days, and  ?  Your symptoms have improved, and ? It's been at least 10 days since your symptoms started. · Talk to your doctor about whether you also need testing, especially if you have a weakened immune system. When should you call for help? Call 911 anytime you think you may need emergency care. For example, call if you have life-threatening symptoms, such as:    · You have severe trouble breathing. (You can't talk at all.)     · You have constant chest pain or pressure.     · You are severely dizzy or lightheaded.     · You are confused or can't think clearly.     · Your face and lips have a blue color.     · You pass out (lose consciousness) or are very hard to wake up. Call your doctor now or seek immediate medical care if:    · You have moderate trouble breathing. (You can't speak a full sentence.)     · You are coughing up blood (more than about 1 teaspoon).     · You have signs of low blood pressure. These include feeling lightheaded; being too weak to stand; and having cold, pale, clammy skin. Watch closely for changes in your health, and be sure to contact your doctor if:    · Your symptoms get worse.     · You are not getting better as expected. Call before you go to the doctor's office. Follow their instructions. And wear a cloth face cover. Current as of: July 10, 2020               Content Version: 12.6  © 2006-2020 [employer]. Care instructions adapted under license by [facility] ([facility]). If you have questions about a medical condition or this instruction, always ask your healthcare professional. [place] [address] any warranty or liability for your use of this information.

## 2021-01-12 NOTE — PROGRESS NOTES
[facility]  [facility] [place]  [place] [address] [place]  Dept: [phone]  Dept Fax: [phone]: [phone]        CHIEF COMPLAINT       Chief Complaint   Patient presents with    Cough     Loss of Smell, Loss of Taste. Cough he has had for a few days. Nurses Notes reviewed and I agree except as noted in the HPI. HISTORY OF PRESENT ILLNESS   Fuller Felty is a 79 y.o. male who presents to [facility] today (1/12/2021) for evaluation of:   Cough  This is a new problem. The current episode started in the past 7 days (x 3 days). The problem has been unchanged. The problem occurs every few hours. The cough is non-productive. Associated symptoms include headaches (slight) and nasal congestion. Pertinent negatives include no chest pain, fever, shortness of breath or wheezing. Associated symptoms comments: Loss of smell today, sense of taste diminished, diarrhea  . Nothing aggravates the symptoms. He has tried nothing for the symptoms. The treatment provided no relief. Daughter who works in healthcare and is living with pt recently tested positive for covid. Daughter is isolating and in quarantine, has distanced from pt. REVIEW OF SYSTEMS     Review of Systems   Constitutional: Positive for fatigue. Negative for fever. HENT: Positive for congestion (slight). Respiratory: Positive for cough. Negative for shortness of breath and wheezing. Cardiovascular: Negative for chest pain. Gastrointestinal: Positive for diarrhea. Negative for nausea and vomiting. Neurological: Positive for headaches (slight).        PAST MEDICAL HISTORY         Diagnosis Date    Benign colonic polyp     Benign prostatic hypertrophy     Deafness     history of     Displaced oblique fracture of shaft of tibia     history of     Esophageal reflux     Fungal arthritis (HCC)     history of     Glaucoma     Hyperlipidemia  Hypertension     Pneumonia     history of     Seasonal allergies     Singers' nodules     history of     Snoring     history of     Tinnitus        SURGICAL HISTORY     Patient  has a past surgical history that includes Colonoscopy (01/2013); laryngoscopy; Foot surgery (1956); and Intracapsular cataract extraction (Left, 11/7/2019). CURRENT MEDICATIONS       Outpatient Medications Prior to Visit   Medication Sig Dispense Refill    Multiple Vitamins-Minerals (PRESERVISION AREDS PO) Take by mouth 2 times daily       simvastatin (ZOCOR) 20 MG tablet       losartan-hydrochlorothiazide (HYZAAR) 50-12.5 MG per tablet       prednisoLONE Acetate-Nepafenac 1-0.1 % SUSP Apply one drop in the operated eye starting at [address] as per [employer]. (Patient not taking: Reported on 1/12/2021) 1 Bottle 1    Prednisolon-Moxiflox-Nepafenac 1-0.5-0.1 % SUSP Use one drop four times a day in the eye to be operated on starting one day prior to cataract surgery, and once on the morning of surgery. (Patient not taking: Reported on 1/12/2021) 1 Bottle 0    diclofenac (VOLTAREN) 50 MG EC tablet Take 50 mg by mouth 2 times daily as needed       No facility-administered medications prior to visit. ALLERGIES     Patient is is allergic to penicillins. FAMILY HISTORY     Patient's family history includes Allergies in an other family member; Cancer in an other family member; Cataracts in his maternal grandfather; Glaucoma in his maternal uncle; Heart Disease in an other family member; Hypertension in an other family member; Denis Lasso in his father; Other in an other family member; Prostate Cancer in his father. SOCIAL HISTORY     Patient  reports that he has quit smoking. He has never used smokeless tobacco. He reports current alcohol use. He reports that he does not use drugs.     PHYSICAL EXAM     VITALS  BP: 125/83, Temp: 98.1 °F (36.7 °C), Pulse: 84, Resp: 16, SpO2: 98 %  Physical Exam Vitals signs reviewed. Constitutional:       General: He is not in acute distress. Appearance: He is not ill-appearing. HENT:      Nose: Rhinorrhea present. No congestion. Eyes:      Pupils: Pupils are equal, round, and reactive to light. Neck:      Musculoskeletal: Normal range of motion and neck supple. No neck rigidity. Cardiovascular:      Rate and Rhythm: Normal rate and regular rhythm. Heart sounds: Normal heart sounds, S1 normal and S2 normal. No murmur. Pulmonary:      Effort: Pulmonary effort is normal. No accessory muscle usage or respiratory distress. Breath sounds: Normal breath sounds. No wheezing or rhonchi. Musculoskeletal: Normal range of motion. Lymphadenopathy:      Cervical: No cervical adenopathy. Skin:     General: Skin is warm and dry. Capillary Refill: Capillary refill takes less than 2 seconds. Neurological:      General: No focal deficit present. Mental Status: He is alert. DIAGNOSTIC RESULTS   Labs:No results found for this visit on 01/12/21. IMAGING:        CLINICAL COURSE:     Vitals:    01/12/21 1739   BP: 125/83   Site: Left Upper Arm   Position: Sitting   Cuff Size: Large Adult   Pulse: 84   Resp: 16   Temp: 98.1 °F (36.7 °C)   TempSrc: Temporal   SpO2: 98%   Weight: 180 lb 6.4 oz (81.8 kg)   Height: 5' 2\" (1.575 m)           PROCEDURES:  None  FINAL IMPRESSION      1. Loss of perception for smell    2. Cough    3. Diarrhea, unspecified type    4. Exposure to COVID-19 virus    5.  Person under investigation for COVID-19         DISPOSITION/PLAN     Patient Instructions Will notify you of covid test result as soon as available. You should isolate at home in an area away from family. If you must be around family members, please wear a mask. Quarantine at home until result is available. This means do not go to work/school, attend family gatherings, or invite others to your home until you know your test results. A work/school note will be provided for you. Symptoms appear viral; no antibiotic warranted at this time. May try mucinex (guaifenesin) to help with chest congestion and robitussin for persistent cough. May use tylenol fever/body aches/headache. Increase water intake to help thin secretions. Use cool mist humidifier at bedtime. Use nasal saline flush as needed. Practice good hand hygiene and cover your cough and sneeze to prevent passing virus on. If symptoms worsen or fail to improve, please return to clinic. If you develop severe worsening of symptoms such as chest pain or difficulty breathing, please go to ER. Patient Education        Learning About Coronavirus [phone])  Coronavirus [phone]): Overview  What is coronavirus ([id number])? The coronavirus disease (COVID-19) is caused by a virus. It is an illness that was first found in December 2019. It has since spread worldwide. The virus can cause fever, cough, and trouble breathing. In severe cases, it can cause pneumonia and make it hard to breathe without help. It can cause death. This virus spreads person-to-person through droplets from coughing and sneezing. It can also spread when you are close to someone who is infected. And it can spread when you touch something that has the virus on it, such as a doorknob or a tabletop. Coronaviruses are a large group of viruses. They cause the common cold. They also cause more serious illnesses like Middle East respiratory syndrome (MERS) and severe acute respiratory syndrome (SARS). COVID-19 is caused by a novel coronavirus. That means it's a new type that has not been seen in people before. How is COVID-19 treated? Mild illness can be treated at home, but more serious illness needs to be treated in the hospital. Treatment may include medicines to reduce symptoms, plus breathing support such as oxygen therapy or a ventilator. Other treatments, such as antiviral medicines, may help people who have COVID-19. What can you do to protect yourself from COVID-19? The best way to protect yourself from getting sick is to:  · Avoid areas where there is an outbreak. · Avoid contact with people who may be infected. · Avoid crowds and try to stay at least 6 feet away from other people. · Wash your hands often, especially after you cough or sneeze. Use soap and water, and scrub for at least 20 seconds. If soap and water aren't available, use an alcohol-based hand . · Avoid touching your mouth, nose, and eyes. What can you do to avoid spreading the virus to others? To help avoid spreading the virus to others:  · Wash your hands often with soap or alcohol-based hand sanitizers. · Cover your mouth with a tissue when you cough or sneeze. Then throw the tissue in the trash. · Use a disinfectant to clean things that you touch often. These include doorknobs, remote controls, phones, and handles on your refrigerator and microwave. And don't forget countertops, tabletops, bathrooms, and computer keyboards. · Wear a cloth face cover if you have to go to public areas. If you know or suspect that you have COVID-19:  · Stay home. Don't go to school, work, or public areas. And don't use public transportation, ride-shares, or taxis unless you have no choice. · Leave your home only if you need to get medical care or testing. But call the doctor's office first so they know you're coming. And wear a face cover. · Limit contact with people in your home. If possible, stay in a separate bedroom and use a separate bathroom. · Wear a face cover whenever you're around other people. It can help stop the spread of the virus when you cough or sneeze. · Clean and disinfect your home every day. Use household  and disinfectant wipes or sprays. Take special care to clean things that you grab with your hands. · Self-isolate until it's safe to be around others again. ? If you have symptoms, it's safe when you haven't had a fever for 3 days and your symptoms have improved and it's been at least 10 days since your symptoms started. ? If you were exposed to the virus but don't have symptoms, it's safe to be around others 14 days after exposure. ? Talk to your doctor about whether you also need testing, especially if you have a weakened immune system. When to call for help  Call 911 anytime you think you may need emergency care. For example, call if:  · You have severe trouble breathing. (You can't talk at all.)  · You have constant chest pain or pressure. · You are severely dizzy or lightheaded. · You are confused or can't think clearly. · Your face and lips have a blue color. · You passed out (lost consciousness) or are very hard to wake up. Call your doctor now if you develop symptoms such as:  · Shortness of breath. · Fever. · Cough. If you need to get care, call ahead to the doctor's office for instructions before you go. Make sure you wear a face cover to prevent exposing other people to the virus. Where can you get the latest information? The following health organizations are tracking and studying this virus. Their websites contain the most up-to-date information. Basilio Wilson also learn what to do if you think you may have been exposed to the virus. · U.[place]. Centers for Disease Control and Prevention (CDC): The CDC provides updated news about the disease and travel advice. The website also tells you how to prevent the spread of infection. www.cdc.gov  · World Health Organization [facility]): WHO offers information about the virus outbreaks. WHO also has travel advice. www.who.int  Current as of: July 10, 2020               Content Version: 12.6  © 2006-2020 [employer]. Care instructions adapted under license by [employer] [facility] ([facility]). If you have questions about a medical condition or this instruction, always ask your healthcare professional. [place] [address] any warranty or liability for your use of this information. Patient Education        Coronavirus ([id number]-[id number]): Care Instructions  Overview  The coronavirus disease (COVID-19) is caused by a virus. Symptoms may include a fever, a cough, and shortness of breath. It mainly spreads person-to-person through droplets from coughing and sneezing. The virus also can spread when people are in close contact with someone who is infected. Most people have mild symptoms and can take care of themselves at home. If their symptoms get worse, they may need care in a hospital. Treatment may include medicines to reduce symptoms, plus breathing support such as oxygen therapy or a ventilator. It's important to not spread the virus to others. If you have COVID-19, wear a face cover anytime you are around other people. You need to isolate yourself while you are sick. Leave your home only if you need to get medical care or testing. Follow-up care is a key part of your treatment and safety. Be sure to make and go to all appointments, and call your doctor if you are having problems. It's also a good idea to know your test results and keep a list of the medicines you take. How can you care for yourself at home? · Get extra rest. It can help you feel better. · Drink plenty of fluids. This helps replace fluids lost from fever. Fluids also help ease a scratchy throat. Water, soup, fruit juice, and hot tea with lemon are good choices. · Take acetaminophen (such as Tylenol) to reduce a fever. It may also help with muscle aches. Read and follow all instructions on the label. · Use petroleum jelly on sore skin. This can help if the skin around your nose and lips becomes sore from rubbing a lot with tissues. Tips for self-isolation  · Limit contact with people in your home. If possible, stay in a separate bedroom and use a separate bathroom. · Wear a cloth face cover when you are around other people. It can help stop the spread of the virus when you cough or sneeze. · If you have to leave home, avoid crowds and try to stay at least 6 feet away from other people. · Avoid contact with pets and other animals. · Cover your mouth and nose with a tissue when you cough or sneeze. Then throw it in the trash right away. · Wash your hands often, especially after you cough or sneeze. Use soap and water, and scrub for at least 20 seconds. If soap and water aren't available, use an alcohol-based hand . · Don't share personal household items. These include bedding, towels, cups and glasses, and eating utensils. · [address] in the warmest water allowed for the fabric type, and dry it completely. It's okay to wash other people's laundry with yours. · Clean and disinfect your home every day. Use household  and disinfectant wipes or sprays. Take special care to clean things that you grab with your hands. These include doorknobs, remote controls, phones, and handles on your refrigerator and microwave. And don't forget countertops, tabletops, bathrooms, and computer keyboards. When you can end self-isolation  · If you know or suspect that you have COVID-19, stay in self-isolation until:  ? You haven't had a fever for 3 days, and  ?  Your symptoms have improved, and ? It's been at least 10 days since your symptoms started. · Talk to your doctor about whether you also need testing, especially if you have a weakened immune system. When should you call for help? Call 911 anytime you think you may need emergency care. For example, call if you have life-threatening symptoms, such as:    · You have severe trouble breathing. (You can't talk at all.)     · You have constant chest pain or pressure.     · You are severely dizzy or lightheaded.     · You are confused or can't think clearly.     · Your face and lips have a blue color.     · You pass out (lose consciousness) or are very hard to wake up. Call your doctor now or seek immediate medical care if:    · You have moderate trouble breathing. (You can't speak a full sentence.)     · You are coughing up blood (more than about 1 teaspoon).     · You have signs of low blood pressure. These include feeling lightheaded; being too weak to stand; and having cold, pale, clammy skin. Watch closely for changes in your health, and be sure to contact your doctor if:    · Your symptoms get worse.     · You are not getting better as expected. Call before you go to the doctor's office. Follow their instructions. And wear a cloth face cover. Current as of: July 10, 2020               Content Version: 12.6  © 2006-2020 [employer]. Care instructions adapted under license by [facility] ([facility]). If you have questions about a medical condition or this instruction, always ask your healthcare professional. [place] [address] any warranty or liability for your use of this information.            Orders Placed This Encounter   Procedures    COVID-19 Ambulatory     Standing Status:   Future     Standing Expiration Date:   2/12/2021     Scheduling Instructions:      Saline media preferred given current shortage of viral transport media but both acceptable Order Specific Question:   Is this test for diagnosis or screening? Answer:   Diagnosis of ill patient     Order Specific Question:   Symptomatic for COVID-19 as defined by CDC? Answer:   Yes     Order Specific Question:   Date of Symptom Onset     Answer:   1/9/2021     Order Specific Question:   Hospitalized for COVID-19? Answer:   No     Order Specific Question:   Admitted to ICU for COVID-19? Answer:   No     Order Specific Question:   Employed in healthcare setting? Answer:   No     Order Specific Question:   Resident in a congregate (group) care setting? Answer:   No     Order Specific Question:   Pregnant: Answer:   No     Order Specific Question:   Previously tested for COVID-19? Answer:   No     Outpatient Encounter Medications as of 1/12/2021   Medication Sig Dispense Refill    Multiple Vitamins-Minerals (PRESERVISION AREDS PO) Take by mouth 2 times daily       simvastatin (ZOCOR) 20 MG tablet       losartan-hydrochlorothiazide (HYZAAR) 50-12.5 MG per tablet       prednisoLONE Acetate-Nepafenac 1-0.1 % SUSP Apply one drop in the operated eye starting at [address] as per [employer]. (Patient not taking: Reported on 1/12/2021) 1 Bottle 1    Prednisolon-Moxiflox-Nepafenac 1-0.5-0.1 % SUSP Use one drop four times a day in the eye to be operated on starting one day prior to cataract surgery, and once on the morning of surgery. (Patient not taking: Reported on 1/12/2021) 1 Bottle 0    diclofenac (VOLTAREN) 50 MG EC tablet Take 50 mg by mouth 2 times daily as needed       No facility-administered encounter medications on file as of 1/12/2021. No follow-ups on file.                 Electronically signed by SWAPNA Varela NP on 1/12/2021 at 6:56 PM

## 2021-01-14 ENCOUNTER — TELEPHONE (OUTPATIENT)
Dept: PRIMARY CARE CLINIC | Age: 71
End: 2021-01-14

## 2021-01-14 LAB — SARS-COV-2, NAA: DETECTED

## (undated) DEVICE — KNIFE SURG 275MM 45DEG ANG SLT DBL BVL

## (undated) DEVICE — AGENT VISCOELASTIC HEALON DUET PRO 2 PK

## (undated) DEVICE — SOLUTION IRRIGATION BAL SALT SOLUTION 15 ML STRL BSS

## (undated) DEVICE — SURGICAL PROCEDURE PACK EYE

## (undated) DEVICE — GLOVE ORANGE PI 7   MSG9070

## (undated) DEVICE — KNIFE OPHTHLMC 17.6X2.03X1.09MM 11MM ANGLD SDPRT LSRDGE ARRO

## (undated) DEVICE — MICS, ANGLED, PHACO NEEDLE: Brand: BAUSCH + LOMB

## (undated) DEVICE — SINGLE USE MEDICAL DEVICE FOR OPHTHALMIC SURGERY: Brand: SIL. COATED I/A 45 STELLARIS 12/B

## (undated) DEVICE — SOLUTION IRRIGATION BAL SALT SOLUTION 500 ML BTL 6/CA BSS +

## (undated) DEVICE — THIN TIP IRRIGATION SLEEVE PACK: Brand: BAUSCH + LOMB

## (undated) DEVICE — GLOVE SURG SZ 65 THK91MIL LTX FREE SYN POLYISOPRENE

## (undated) DEVICE — ADAPTIVE FLUIDICS BASIC PACK: Brand: BAUSCH + LOMB